# Patient Record
Sex: FEMALE | Race: WHITE | Employment: UNEMPLOYED | URBAN - METROPOLITAN AREA
[De-identification: names, ages, dates, MRNs, and addresses within clinical notes are randomized per-mention and may not be internally consistent; named-entity substitution may affect disease eponyms.]

---

## 2017-02-23 ENCOUNTER — HOSPITAL ENCOUNTER (OUTPATIENT)
Dept: LAB | Age: 42
Discharge: HOME | End: 2017-02-23
Payer: COMMERCIAL

## 2017-02-23 ENCOUNTER — OFFICE VISIT (OUTPATIENT)
Dept: FAMILY MEDICINE CLINIC | Age: 42
End: 2017-02-23

## 2017-02-23 VITALS
SYSTOLIC BLOOD PRESSURE: 144 MMHG | HEART RATE: 85 BPM | OXYGEN SATURATION: 100 % | DIASTOLIC BLOOD PRESSURE: 87 MMHG | BODY MASS INDEX: 18.52 KG/M2 | HEIGHT: 67 IN | WEIGHT: 118 LBS | RESPIRATION RATE: 20 BRPM | TEMPERATURE: 97.1 F

## 2017-02-23 DIAGNOSIS — R53.83 FATIGUE, UNSPECIFIED TYPE: Primary | ICD-10-CM

## 2017-02-23 DIAGNOSIS — R53.83 FATIGUE, UNSPECIFIED TYPE: ICD-10-CM

## 2017-02-23 DIAGNOSIS — F10.11 HISTORY OF ALCOHOL ABUSE: ICD-10-CM

## 2017-02-23 DIAGNOSIS — F98.8 ADD (ATTENTION DEFICIT DISORDER): ICD-10-CM

## 2017-02-23 LAB
ALBUMIN SERPL BCP-MCNC: 3.5 G/DL (ref 3.4–5)
ALBUMIN/GLOB SERPL: 0.9 {RATIO} (ref 0.8–1.7)
ALP SERPL-CCNC: 151 U/L (ref 45–117)
ALT SERPL-CCNC: 75 U/L (ref 13–56)
ANION GAP BLD CALC-SCNC: 12 MMOL/L (ref 3–18)
AST SERPL W P-5'-P-CCNC: 267 U/L (ref 15–37)
BASOPHILS # BLD AUTO: 0 K/UL (ref 0–0.06)
BASOPHILS # BLD: 1 % (ref 0–2)
BILIRUB SERPL-MCNC: 1.2 MG/DL (ref 0.2–1)
BUN SERPL-MCNC: 6 MG/DL (ref 7–18)
BUN/CREAT SERPL: 11 (ref 12–20)
CALCIUM SERPL-MCNC: 8.7 MG/DL (ref 8.5–10.1)
CHLORIDE SERPL-SCNC: 97 MMOL/L (ref 100–108)
CO2 SERPL-SCNC: 27 MMOL/L (ref 21–32)
CREAT SERPL-MCNC: 0.57 MG/DL (ref 0.6–1.3)
DIFFERENTIAL METHOD BLD: ABNORMAL
EOSINOPHIL # BLD: 0 K/UL (ref 0–0.4)
EOSINOPHIL NFR BLD: 1 % (ref 0–5)
ERYTHROCYTE [DISTWIDTH] IN BLOOD BY AUTOMATED COUNT: 12.8 % (ref 11.6–14.5)
ETHANOL SERPL-MCNC: 100 MG/DL (ref 0–3)
GLOBULIN SER CALC-MCNC: 3.8 G/DL (ref 2–4)
GLUCOSE SERPL-MCNC: 74 MG/DL (ref 74–99)
HCT VFR BLD AUTO: 39.6 % (ref 35–45)
HGB BLD-MCNC: 13.3 G/DL (ref 12–16)
LYMPHOCYTES # BLD AUTO: 20 % (ref 21–52)
LYMPHOCYTES # BLD: 0.6 K/UL (ref 0.9–3.6)
MCH RBC QN AUTO: 39.2 PG (ref 24–34)
MCHC RBC AUTO-ENTMCNC: 33.6 G/DL (ref 31–37)
MCV RBC AUTO: 116.8 FL (ref 74–97)
MONOCYTES # BLD: 0.4 K/UL (ref 0.05–1.2)
MONOCYTES NFR BLD AUTO: 11 % (ref 3–10)
NEUTS SEG # BLD: 2.1 K/UL (ref 1.8–8)
NEUTS SEG NFR BLD AUTO: 67 % (ref 40–73)
PLATELET # BLD AUTO: 107 K/UL (ref 135–420)
PMV BLD AUTO: 12.6 FL (ref 9.2–11.8)
POTASSIUM SERPL-SCNC: 3.4 MMOL/L (ref 3.5–5.5)
PROT SERPL-MCNC: 7.3 G/DL (ref 6.4–8.2)
RBC # BLD AUTO: 3.39 M/UL (ref 4.2–5.3)
SODIUM SERPL-SCNC: 136 MMOL/L (ref 136–145)
TSH SERPL DL<=0.05 MIU/L-ACNC: 3.18 UIU/ML (ref 0.36–3.74)
WBC # BLD AUTO: 3.2 K/UL (ref 4.6–13.2)

## 2017-02-23 PROCEDURE — 84443 ASSAY THYROID STIM HORMONE: CPT | Performed by: FAMILY MEDICINE

## 2017-02-23 PROCEDURE — 80053 COMPREHEN METABOLIC PANEL: CPT | Performed by: FAMILY MEDICINE

## 2017-02-23 PROCEDURE — 80307 DRUG TEST PRSMV CHEM ANLYZR: CPT | Performed by: FAMILY MEDICINE

## 2017-02-23 PROCEDURE — 80353 DRUG SCREENING COCAINE: CPT | Performed by: FAMILY MEDICINE

## 2017-02-23 PROCEDURE — 85025 COMPLETE CBC W/AUTO DIFF WBC: CPT | Performed by: FAMILY MEDICINE

## 2017-02-23 PROCEDURE — 36415 COLL VENOUS BLD VENIPUNCTURE: CPT | Performed by: FAMILY MEDICINE

## 2017-02-23 PROCEDURE — 82542 COL CHROMOTOGRAPHY QUAL/QUAN: CPT | Performed by: FAMILY MEDICINE

## 2017-02-23 PROCEDURE — 86664 EPSTEIN-BARR NUCLEAR ANTIGEN: CPT | Performed by: FAMILY MEDICINE

## 2017-02-23 PROCEDURE — 86308 HETEROPHILE ANTIBODY SCREEN: CPT | Performed by: FAMILY MEDICINE

## 2017-02-23 NOTE — PROGRESS NOTES
HISTORY OF PRESENT ILLNESS  Kathya Sepulveda is a 37 y.o. female. Chief Complaint   Patient presents with    Fatigue     x 2 weeks, and sleeping all day       HPI  Pt here for eval of fatigue and weakness x 2 wks. She notes that she is sleeping excessively. She feels that her legs are very weak. She has no problems walking or going down stairs, but trying to climb up the stairs is difficult. She had trouble getting off the toilet as well. She is able to perform adl's without assistance. Pt works from home; she has been able to do her work without missing any time. She had her boyfriend drive her to the appt today because of concern that she might fall asleep while driving. Pt notes that she has had a cough for a few days. No other associated sx - no fever, chills, congestion, n/v, d/c. Pt states that she has not had any etoh since 2/20/17. She is drinking less etoh overall now than she had been previously. Pt requests refill of add med today. Review of Systems   Constitutional: Positive for malaise/fatigue. Negative for chills and fever. HENT: Negative. Negative for congestion. Respiratory: Positive for cough. Cardiovascular: Negative. Gastrointestinal: Negative for constipation, diarrhea, nausea and vomiting. Musculoskeletal: Negative for myalgias. Neurological: Positive for weakness. Negative for headaches. All other systems reviewed and are negative. Physical Exam   Constitutional: She is oriented to person, place, and time. She appears well-developed and well-nourished. She is cooperative. No distress. HENT:   Head: Normocephalic and atraumatic.    Right Ear: Hearing, tympanic membrane, external ear and ear canal normal.   Left Ear: Hearing, tympanic membrane, external ear and ear canal normal.   Nose: Nose normal.   Mouth/Throat: Uvula is midline, oropharynx is clear and moist and mucous membranes are normal.   Eyes: Conjunctivae and EOM are normal.   Neck: Normal range of motion. Neck supple. No JVD present. No thyromegaly present. Cardiovascular: Normal rate, regular rhythm and normal heart sounds. Exam reveals no gallop and no friction rub. No murmur heard. Pulmonary/Chest: Effort normal and breath sounds normal. She has no wheezes. She has no rhonchi. She has no rales. Musculoskeletal: Normal range of motion. Lymphadenopathy:     She has no cervical adenopathy. Neurological: She is alert and oriented to person, place, and time. Coordination normal.   Skin: Skin is warm and dry. Psychiatric: She has a normal mood and affect. Her behavior is normal. Judgment and thought content normal.   Nursing note and vitals reviewed. ASSESSMENT and PLAN  Ney Josue was seen today for fatigue. Diagnoses and all orders for this visit:    Fatigue, unspecified type  -     METABOLIC PANEL, COMPREHENSIVE; Future  -     CBC WITH AUTOMATED DIFF; Future  -     TSH 3RD GENERATION; Future  -     MONO SCREEN W/ REFLX EBV; Future  -     DRUG SCREEN UR - W/ CONFIRM ([facility] Only); Future  -     ETHYL ALCOHOL; Future    History of alcohol abuse  -     DRUG SCREEN UR - W/ CONFIRM ([facility] Only); Future  -     ETHYL ALCOHOL; Future    ADD (attention deficit disorder)  Will hold off on prescribing until we have a better understanding of current sx.       Follow-up Disposition: as indicated

## 2017-02-23 NOTE — MR AVS SNAPSHOT
Visit Information Date & Time Provider Department Dept. Phone Encounter #  
 2/23/2017  9:45 AM Salma Ruiz MD [address] [id number] Upcoming Health Maintenance Date Due Pneumococcal 19-64 Medium Risk (1 of 1 - PPSV23) 8/30/1995 DTaP/Tdap/Td series (1 - Tdap) 8/30/1997 PAP AKA CERVICAL CYTOLOGY 10/18/2017 Allergies as of 2/23/2017  Review Complete On: 2/23/2017 By: Salma Ruiz MD  
  
 Severity Noted Reaction Type Reactions Sulfa (Sulfonamide Antibiotics) High 02/13/2012    Nausea and Vomiting Current Immunizations  Reviewed on 10/30/2014 Name Date Influenza Vaccine (Quad) PF 10/18/2016 Influenza Vaccine PF 9/28/2015, 10/30/2014 Not reviewed this visit You Were Diagnosed With   
  
 Codes Comments Fatigue, unspecified type    -  Primary ICD-10-CM: R53.83 ICD-9-CM: 780.79 History of alcohol abuse     ICD-10-CM: Z87.898 ICD-9-CM: 305.03   
 ADD (attention deficit disorder)     ICD-10-CM: F98.8 ICD-9-CM: 314.00 Vitals BP  
  
  
  
  
  
 144/87 (BP 1 Location: Left arm, BP Patient Position: Sitting) BMI and BSA Data Body Mass Index Body Surface Area  
 18.48 kg/m 2 1.59 m 2 Preferred Pharmacy Pharmacy Name Phone [address] [facility], [address] [place] [address] [phone] Your Updated Medication List  
  
   
This list is accurate as of: 2/23/17  4:26 PM.  Always use your most recent med list.  
  
  
  
  
 ALPRAZolam 0.25 mg tablet Commonly known as:  Shyrl Asai Take 1 Tab by mouth nightly as needed for Anxiety. cetirizine 10 mg tablet Commonly known as:  ZYRTEC Take 1 Tab by mouth daily. dextroamphetamine-amphetamine 10 mg tablet Commonly known as:  ADDERALL Take 1 Tab by mouth two (2) times a day. escitalopram oxalate 10 mg tablet Commonly known as:  Ivan Hall Take 1 Tab by mouth daily. fluticasone 50 mcg/actuation nasal spray Commonly known as:  Filbert Chard 2 Sprays by Both Nostrils route daily. ibuprofen 800 mg tablet Commonly known as:  MOTRIN Take 1 tablet by mouth every eight (8) hours as needed for Pain.  
  
 imiquimod 5 % cream  
Commonly known as:  Kristan Senegal Apply 3 times per week at bedtime. Wash off after 8 hours. Use for up to 16 weeks. montelukast 10 mg tablet Commonly known as:  SINGULAIR Take 1 Tab by mouth daily. norethindrone-ethinyl estradiol 0.5/0.75/1 mg- 35 mcg Tab Commonly known as:  Deetta Putty 7/7/7 (28) Take 1 Tab by mouth daily. Patient Instructions Please contact our office if you have any questions about your visit today. Introducing [facility]! Velasquez Baker introduces [employer] patient portal. Now you can access parts of your medical record, email your doctor's office, and request medication refills online. 1. In your internet browser, go to [email or web address] 2. Click on the First Time User? Click Here link in the Sign In box. You will see the New Member Sign Up page. 3. Enter your [employer] Access Code exactly as it appears below. You will not need to use this code after youve completed the sign-up process. If you do not sign up before the expiration date, you must request a new code. · [employer] Access Code: [email or web address] Expires: 5/24/2017  9:54 AM 
 
4. Enter the last four digits of your Social Security Number (xxxx) and Date of Birth (mm/dd/yyyy) as indicated and click Submit. You will be taken to the next sign-up page. 5. Create a [employer] ID. This will be your [employer] login ID and cannot be changed, so think of one that is secure and easy to remember. 6. Create a [employer] password. You can change your password at any time. 7. Enter your Password Reset Question and Answer. This can be used at a later time if you forget your password. 8. Enter your e-mail address. You will receive e-mail notification when new information is available in [address] 9. Click Sign Up. You can now view and download portions of your medical record. 10. Click the Download Summary menu link to download a portable copy of your medical information. If you have questions, please visit the Frequently Asked Questions section of the [employer] website. Remember, [employer] is NOT to be used for urgent needs. For medical emergencies, dial 911. Now available from your iPhone and Android! Please provide this summary of care documentation to your next provider. Your primary care clinician is listed as Nisha Red. If you have any questions after today's visit, please call [phone].

## 2017-02-23 NOTE — PROGRESS NOTES
Efrain Fostre 37 y.o. female   Chief Complaint   Patient presents with    Fatigue     x 2 weeks, and sleeping all day         1. Have you been to the ER, urgent care clinic since your last visit? Hospitalized since your last visit? No    2. Have you seen or consulted any other health care providers outside of the [address] [place] [address] since your last visit? Include any pap smears or colon screening.  No

## 2017-02-23 NOTE — LETTER
NOTIFICATION RETURN TO WORK / SCHOOL 
 
2/23/2017 10:48 AM 
 
Ms. Judith Castillo [address] [employer] [address] To Whom It May Concern: 
 
Judith Castillo is currently under the care of Nicole Brooke. She will return to work/school on: 02-23-17 If there are questions or concerns please have the patient contact our office. Sincerely, Aniyah Willams MD

## 2017-02-24 ENCOUNTER — TELEPHONE (OUTPATIENT)
Dept: FAMILY MEDICINE CLINIC | Age: 42
End: 2017-02-24

## 2017-02-24 PROBLEM — F10.20 ALCOHOLISM (HCC): Status: ACTIVE | Noted: 2017-02-24

## 2017-02-24 PROBLEM — F19.90 SUBSTANCE USE DISORDER: Status: ACTIVE | Noted: 2017-02-24

## 2017-02-25 LAB
EBV EA IGG SER-ACNC: >150 U/ML (ref 0–8.9)
EBV NA IGG SER-ACNC: 135 U/ML (ref 0–17.9)
EBV VCA IGG SER-ACNC: >600 U/ML (ref 0–17.9)
EBV VCA IGM SER-ACNC: <36 U/ML (ref 0–35.9)
HETEROPH AB SER QL: NEGATIVE
INTERPRETATION, 169995: ABNORMAL

## 2017-02-27 NOTE — PROGRESS NOTES
Please notify pt: fatigue may be related to mono, either a recent infection or reactivated infection. Would still recommend d/c of drugs and alcohol. Please sched appt asap for discussion of mono.   (please sched with Dr Ash Kim if he has availability for this week.  )

## 2017-03-03 NOTE — PROGRESS NOTES
Patient notified of DR. Buckley comments on results listed. Pt declined appt this week due to schedule. Pt states that she will call back to schedule an appointment.

## 2017-03-05 LAB
AMPHET UR QL SCN: NEGATIVE
BARBITURATES UR QL SCN: NEGATIVE
BENZODIAZ UR QL: NEGATIVE
BZE UR CFM-MCNC: >5000 NG/ML
BZE UR QL: POSITIVE
CANNABINOIDS UR QL CFM: POSITIVE
CANNABINOIDS UR QL SCN: POSITIVE
COCAINE UR QL SCN: POSITIVE
HDSCOM,HDSCOM: ABNORMAL
METHADONE UR QL: NEGATIVE
OPIATES UR QL: NEGATIVE
PCP UR QL: NEGATIVE
THC UR CFM-MCNC: 309 NG/ML

## 2017-10-08 PROBLEM — K70.10 ALCOHOLIC HEPATITIS: Status: ACTIVE | Noted: 2017-10-08

## 2017-10-08 PROBLEM — D64.9 ANEMIA: Status: ACTIVE | Noted: 2017-10-08

## 2017-10-08 PROBLEM — K85.20 ALCOHOLIC PANCREATITIS: Status: ACTIVE | Noted: 2017-10-08

## 2017-10-08 PROBLEM — E80.6 HYPERBILIRUBINEMIA: Status: ACTIVE | Noted: 2017-10-08

## 2017-10-15 PROBLEM — E86.0 DEHYDRATION: Status: ACTIVE | Noted: 2017-10-15

## 2017-10-17 PROBLEM — K70.30 CIRRHOSIS, ALCOHOLIC (HCC): Status: ACTIVE | Noted: 2017-10-17
